# Patient Record
Sex: MALE | Race: WHITE | URBAN - METROPOLITAN AREA
[De-identification: names, ages, dates, MRNs, and addresses within clinical notes are randomized per-mention and may not be internally consistent; named-entity substitution may affect disease eponyms.]

---

## 2017-12-15 ENCOUNTER — OFFICE VISIT (OUTPATIENT)
Dept: FAMILY MEDICINE CLINIC | Age: 32
End: 2017-12-15

## 2017-12-15 ENCOUNTER — TELEPHONE (OUTPATIENT)
Dept: ADMINISTRATIVE | Age: 32
End: 2017-12-15

## 2017-12-15 VITALS
HEIGHT: 76 IN | TEMPERATURE: 98.3 F | BODY MASS INDEX: 29.45 KG/M2 | SYSTOLIC BLOOD PRESSURE: 118 MMHG | HEART RATE: 81 BPM | OXYGEN SATURATION: 98 % | DIASTOLIC BLOOD PRESSURE: 86 MMHG | WEIGHT: 241.8 LBS

## 2017-12-15 DIAGNOSIS — J01.90 ACUTE BACTERIAL SINUSITIS: ICD-10-CM

## 2017-12-15 DIAGNOSIS — B96.89 ACUTE BACTERIAL SINUSITIS: ICD-10-CM

## 2017-12-15 DIAGNOSIS — R05.9 COUGH: Primary | ICD-10-CM

## 2017-12-15 PROCEDURE — 96372 THER/PROPH/DIAG INJ SC/IM: CPT | Performed by: FAMILY MEDICINE

## 2017-12-15 PROCEDURE — 94640 AIRWAY INHALATION TREATMENT: CPT | Performed by: FAMILY MEDICINE

## 2017-12-15 PROCEDURE — 99214 OFFICE O/P EST MOD 30 MIN: CPT | Performed by: FAMILY MEDICINE

## 2017-12-15 RX ORDER — METHYLPREDNISOLONE ACETATE 80 MG/ML
80 INJECTION, SUSPENSION INTRA-ARTICULAR; INTRALESIONAL; INTRAMUSCULAR; SOFT TISSUE ONCE
Status: COMPLETED | OUTPATIENT
Start: 2017-12-15 | End: 2017-12-15

## 2017-12-15 RX ORDER — AMOXICILLIN 500 MG/1
1000 CAPSULE ORAL 2 TIMES DAILY
Qty: 40 CAPSULE | Refills: 0 | Status: SHIPPED | OUTPATIENT
Start: 2017-12-15 | End: 2017-12-25

## 2017-12-15 RX ORDER — ALBUTEROL SULFATE 90 UG/1
2 AEROSOL, METERED RESPIRATORY (INHALATION) EVERY 6 HOURS PRN
Qty: 1 INHALER | Refills: 0 | Status: SHIPPED | OUTPATIENT
Start: 2017-12-15

## 2017-12-15 RX ORDER — AMOXICILLIN AND CLAVULANATE POTASSIUM 875; 125 MG/1; MG/1
1 TABLET, FILM COATED ORAL 2 TIMES DAILY
Qty: 20 TABLET | Refills: 0 | Status: CANCELLED | OUTPATIENT
Start: 2017-12-15 | End: 2017-12-25

## 2017-12-15 RX ORDER — ALBUTEROL SULFATE 2.5 MG/3ML
2.5 SOLUTION RESPIRATORY (INHALATION) ONCE
Status: COMPLETED | OUTPATIENT
Start: 2017-12-15 | End: 2017-12-15

## 2017-12-15 RX ADMIN — ALBUTEROL SULFATE 2.5 MG: 2.5 SOLUTION RESPIRATORY (INHALATION) at 13:19

## 2017-12-15 RX ADMIN — METHYLPREDNISOLONE ACETATE 80 MG: 80 INJECTION, SUSPENSION INTRA-ARTICULAR; INTRALESIONAL; INTRAMUSCULAR; SOFT TISSUE at 13:12

## 2017-12-15 ASSESSMENT — ENCOUNTER SYMPTOMS
COUGH: 1
RHINORRHEA: 1
SINUS PRESSURE: 1
SORE THROAT: 1
SINUS PAIN: 1

## 2017-12-15 ASSESSMENT — PATIENT HEALTH QUESTIONNAIRE - PHQ9
2. FEELING DOWN, DEPRESSED OR HOPELESS: 0
SUM OF ALL RESPONSES TO PHQ9 QUESTIONS 1 & 2: 0
SUM OF ALL RESPONSES TO PHQ QUESTIONS 1-9: 0
1. LITTLE INTEREST OR PLEASURE IN DOING THINGS: 0

## 2017-12-15 NOTE — TELEPHONE ENCOUNTER
Please help schedule new appointments for him and his wife at the next regular time for new patient's. He was previous [place] will need to make sure of JS quantity on schedule as well. Thank you.

## 2017-12-15 NOTE — PROGRESS NOTES
Alfie Faye  : 1985  Encounter date: 12/15/2017    This is a 27 y.o. male who presents with  Chief Complaint   Patient presents with    URI     Patient complains of chest pain, chills, fever, congestion, and cough since Monday. Symptoms are gradually worsening. Mojgan is currently taking dayquil, nightquil, and lenard-seltzer that provides mild relief. History of present illness:    Monday started with heaviness feeling in chest. Fatigue, fevers/chills by Tuesday. Yesterday started feeling better, went to work. Last night congestion worsened. Usually tries to wait out cold, but not seeming to clear it on own. Headache due to above. Not coughing much. No Known Allergies  Outpatient Prescriptions Marked as Taking for the 12/15/17 encounter (Office Visit) with Ywa Blandon MD   Medication Sig Dispense Refill    amoxicillin (AMOXIL) 500 MG capsule Take 2 capsules by mouth 2 times daily for 10 days 40 capsule 0    albuterol sulfate HFA (PROAIR HFA) 108 (90 Base) MCG/ACT inhaler Inhale 2 puffs into the lungs every 6 hours as needed for Wheezing 1 Inhaler 0    lansoprazole (PREVACID) 30 MG capsule Take 30 mg by mouth daily. Review of Systems   Constitutional: Positive for chills and fever. HENT: Positive for congestion, postnasal drip, rhinorrhea, sinus pain, sinus pressure (under eyes) and sore throat. Ear pain: pressure. Respiratory: Positive for cough. Objective:    /86 (Site: Left Arm, Position: Sitting, Cuff Size: Large Adult)   Pulse 81   Temp 98.3 °F (36.8 °C) (Temporal)   Ht 6' 3.75\" (1.924 m)   Wt 241 lb 12.8 oz (109.7 kg)   SpO2 98%   BMI 29.63 kg/m²   Weight: 241 lb 12.8 oz (109.7 kg)     BP Readings from Last 3 Encounters:   12/15/17 118/86   14 139/80     Wt Readings from Last 3 Encounters:   12/15/17 241 lb 12.8 oz (109.7 kg)   14 274 lb (124.3 kg)       Physical Exam   Constitutional: He appears well-developed and well-nourished. He appears ill. No distress. HENT:   Right Ear: Tympanic membrane and ear canal normal.   Left Ear: Tympanic membrane and ear canal normal.   Nose: Mucosal edema and rhinorrhea present. Right sinus exhibits maxillary sinus tenderness and frontal sinus tenderness. Left sinus exhibits maxillary sinus tenderness and frontal sinus tenderness. Mouth/Throat: Uvula is midline and mucous membranes are normal. Posterior oropharyngeal edema and posterior oropharyngeal erythema present. Heavy cobblestoning oropharynx   Cardiovascular: Normal rate, regular rhythm and normal heart sounds. Pulmonary/Chest: Effort normal. He has decreased breath sounds (diffusely diminished; sol at bases). Assessment/Plan    1. Cough  Improved aeration after albuterol neb. Can use if needed at home. - albuterol (PROVENTIL) nebulizer solution 2.5 mg; Take 3 mLs by nebulization once  - TN PRESSURIZED/NONPRESSURIZED INHALATION TREATMENT    2. Acute bacterial sinusitis  abx if sx worsening; give depo a chance and sx treatment for next 24 hours. Return if symptoms worsen or fail to improve, for schedule physical next year.

## 2017-12-15 NOTE — TELEPHONE ENCOUNTER
Patient use to go to this office over five yrs ago and wants to reestablish with Dr. Chandni Ramos. Use to see Dr. Beena Souza. His wife would like to as well she was apatient in past too.  alejandra Cr [phone]